# Patient Record
Sex: MALE | Race: BLACK OR AFRICAN AMERICAN | NOT HISPANIC OR LATINO | Employment: FULL TIME | URBAN - METROPOLITAN AREA
[De-identification: names, ages, dates, MRNs, and addresses within clinical notes are randomized per-mention and may not be internally consistent; named-entity substitution may affect disease eponyms.]

---

## 2019-08-14 ENCOUNTER — HOSPITAL ENCOUNTER (EMERGENCY)
Facility: HOSPITAL | Age: 64
Discharge: HOME | End: 2019-08-15
Attending: EMERGENCY MEDICINE
Payer: COMMERCIAL

## 2019-08-14 VITALS
DIASTOLIC BLOOD PRESSURE: 84 MMHG | WEIGHT: 190 LBS | SYSTOLIC BLOOD PRESSURE: 180 MMHG | BODY MASS INDEX: 30.53 KG/M2 | OXYGEN SATURATION: 97 % | RESPIRATION RATE: 20 BRPM | HEIGHT: 66 IN | TEMPERATURE: 99 F | HEART RATE: 89 BPM

## 2019-08-14 DIAGNOSIS — S91.301A OPEN WOUND OF RIGHT FOOT, INITIAL ENCOUNTER: ICD-10-CM

## 2019-08-14 DIAGNOSIS — I10 ESSENTIAL HYPERTENSION: ICD-10-CM

## 2019-08-14 DIAGNOSIS — R60.0 PEDAL EDEMA: ICD-10-CM

## 2019-08-14 DIAGNOSIS — B35.3 TINEA PEDIS OF BOTH FEET: Primary | ICD-10-CM

## 2019-08-14 LAB — POCT GLUCOSE: 102 MG/DL (ref 70–110)

## 2019-08-14 PROCEDURE — 25000003 PHARM REV CODE 250: Mod: ER | Performed by: EMERGENCY MEDICINE

## 2019-08-14 PROCEDURE — 82962 GLUCOSE BLOOD TEST: CPT | Mod: ER

## 2019-08-14 PROCEDURE — 99284 EMERGENCY DEPT VISIT MOD MDM: CPT | Mod: 25,ER

## 2019-08-14 RX ORDER — HYDROCHLOROTHIAZIDE 25 MG/1
25 TABLET ORAL DAILY
Qty: 30 TABLET | Refills: 0 | Status: SHIPPED | OUTPATIENT
Start: 2019-08-14 | End: 2021-03-19

## 2019-08-14 RX ORDER — CLOTRIMAZOLE 1 %
CREAM (GRAM) TOPICAL
Qty: 113 G | Refills: 0 | Status: SHIPPED | OUTPATIENT
Start: 2019-08-14 | End: 2019-09-20 | Stop reason: SDUPTHER

## 2019-08-14 RX ORDER — MUPIROCIN 20 MG/G
1 OINTMENT TOPICAL
Status: COMPLETED | OUTPATIENT
Start: 2019-08-14 | End: 2019-08-14

## 2019-08-14 RX ORDER — DOXYCYCLINE HYCLATE 100 MG
100 TABLET ORAL
Status: COMPLETED | OUTPATIENT
Start: 2019-08-14 | End: 2019-08-14

## 2019-08-14 RX ORDER — FUROSEMIDE 20 MG/1
40 TABLET ORAL
Status: COMPLETED | OUTPATIENT
Start: 2019-08-14 | End: 2019-08-14

## 2019-08-14 RX ORDER — DOXYCYCLINE 100 MG/1
100 CAPSULE ORAL 2 TIMES DAILY
Qty: 20 CAPSULE | Refills: 0 | Status: SHIPPED | OUTPATIENT
Start: 2019-08-14 | End: 2019-08-24

## 2019-08-14 RX ORDER — MUPIROCIN 20 MG/G
OINTMENT TOPICAL 3 TIMES DAILY
Qty: 30 G | Refills: 1 | Status: SHIPPED | OUTPATIENT
Start: 2019-08-14 | End: 2021-03-19

## 2019-08-14 RX ADMIN — MUPIROCIN 22 G: 20 OINTMENT TOPICAL at 11:08

## 2019-08-14 RX ADMIN — FUROSEMIDE 40 MG: 20 TABLET ORAL at 11:08

## 2019-08-14 RX ADMIN — DOXYCYCLINE HYCLATE 100 MG: 100 TABLET, COATED ORAL at 11:08

## 2019-08-15 NOTE — ED TRIAGE NOTES
pt has a wound on top of right foot x several weeks. [facility] but feels it is getting worse. denies fevers.

## 2019-08-15 NOTE — ED PROVIDER NOTES
"Encounter Date: 8/14/2019       History     Chief Complaint   Patient presents with    Wound Check     pt has a wound on top of right foot x several weeks. [facility] but feels it is getting worse. denies fevers.     64-year-old male presents for evaluation of wound on top of right foot has been present for several weeks.  Patient has been seen by [facility] for this problem but feels it is getting worse and they are not really helping.  Patient also reports wearing boots at work as feet often get wet and her swollen by the and today.  Patient also feels he has a fungal foot infection.  Patient is also concerned about a fungal infection in all of his toenails.        Review of patient's allergies indicates:  No Known Allergies  History reviewed. No pertinent past medical history.  Past Surgical History:   Procedure Laterality Date    APPENDECTOMY       History reviewed. No pertinent family history.  Social History     Tobacco Use    Smoking status: Current Every Day Smoker     Packs/day: 0.50     Types: Cigarettes    Smokeless tobacco: Never Used   Substance Use Topics    Alcohol use: Yes     Frequency: Never     Comment: "beer sometimes"    Drug use: Never     Review of Systems   Cardiovascular: Positive for leg swelling.   Skin: Positive for rash and wound.   All other systems reviewed and are negative.      Physical Exam     Initial Vitals [08/14/19 2311]   BP Pulse Resp Temp SpO2   (!) 180/84 89 20 98.6 °F (37 °C) 97 %      MAP       --         Physical Exam    Nursing note and vitals reviewed.  Constitutional: He appears well-developed and well-nourished.   HENT:   Head: Normocephalic and atraumatic.   Mouth/Throat: Oropharynx is clear and moist.   Eyes: EOM are normal. Pupils are equal, round, and reactive to light.   Cardiovascular: Normal rate, regular rhythm and normal heart sounds.   Pulmonary/Chest: Breath sounds normal.   Abdominal: Soft. Bowel sounds are normal. "   Musculoskeletal: He exhibits edema and tenderness.   Neurological: He is alert and oriented to person, place, and time. GCS score is 15. GCS eye subscore is 4. GCS verbal subscore is 5. GCS motor subscore is 6.   Skin: Capillary refill takes 2 to 3 seconds. Rash noted.   Positive onychomycosis on all toenails.  Positive tinea pedis bilateral feet.  Dorsal foot ulcer at proximal 3rd and 4th metatarsals.  Ulcer measures 2 cm by 1.8 cm and with pain base.  Appears to be stage I decubitus.         ED Course   Procedures  Labs Reviewed   POCT GLUCOSE         Results for orders placed or performed during the hospital encounter of 08/14/19   POCT glucose   Result Value Ref Range    POCT Glucose 102 70 - 110 mg/dL         Imaging Results    None                               Clinical Impression:       ICD-10-CM ICD-9-CM   1. Tinea pedis of both feet B35.3 110.4   2. Pedal edema R60.0 782.3   3. Essential hypertension I10 401.9   4. Open wound of right foot, initial encounter S91.301A 892.0                                Ricardo Dill MD  08/15/19 0248

## 2019-08-19 ENCOUNTER — TELEPHONE (OUTPATIENT)
Dept: FAMILY MEDICINE | Facility: CLINIC | Age: 64
End: 2019-08-19

## 2019-08-19 NOTE — TELEPHONE ENCOUNTER
Spoke to pt wife and she was given an appointment with Dr. Almanzar on Friday. The appointment slip was mailed to pt home.

## 2019-08-19 NOTE — TELEPHONE ENCOUNTER
----- Message from Leni Hawk sent at 8/19/2019  1:37 PM CDT -----  Contact: Nima Alcantar [phone]  New patient needs to be seen sooner than the next available appointment for an ER f/u.

## 2019-08-23 ENCOUNTER — LAB VISIT (OUTPATIENT)
Dept: LAB | Facility: HOSPITAL | Age: 64
End: 2019-08-23
Attending: FAMILY MEDICINE
Payer: COMMERCIAL

## 2019-08-23 ENCOUNTER — OFFICE VISIT (OUTPATIENT)
Dept: FAMILY MEDICINE | Facility: CLINIC | Age: 64
End: 2019-08-23
Payer: COMMERCIAL

## 2019-08-23 VITALS
HEART RATE: 86 BPM | TEMPERATURE: 99 F | SYSTOLIC BLOOD PRESSURE: 118 MMHG | WEIGHT: 198.88 LBS | DIASTOLIC BLOOD PRESSURE: 80 MMHG | OXYGEN SATURATION: 95 % | BODY MASS INDEX: 31.96 KG/M2 | HEIGHT: 66 IN

## 2019-08-23 DIAGNOSIS — Z11.59 NEED FOR HEPATITIS C SCREENING TEST: ICD-10-CM

## 2019-08-23 DIAGNOSIS — Z13.220 NEED FOR LIPID SCREENING: ICD-10-CM

## 2019-08-23 DIAGNOSIS — Z23 NEED FOR PNEUMOCOCCAL VACCINATION: ICD-10-CM

## 2019-08-23 DIAGNOSIS — B35.3 TINEA PEDIS OF BOTH FEET: ICD-10-CM

## 2019-08-23 DIAGNOSIS — B35.4 TINEA CORPORIS: ICD-10-CM

## 2019-08-23 DIAGNOSIS — Z23 NEED FOR TETANUS BOOSTER: ICD-10-CM

## 2019-08-23 DIAGNOSIS — Z12.11 SCREENING FOR COLON CANCER: Primary | ICD-10-CM

## 2019-08-23 DIAGNOSIS — T14.8XXA OPEN WOUND: ICD-10-CM

## 2019-08-23 LAB
ALBUMIN SERPL BCP-MCNC: 4.7 G/DL (ref 3.5–5.2)
ALP SERPL-CCNC: 80 U/L (ref 38–126)
ALT SERPL W/O P-5'-P-CCNC: 45 U/L (ref 10–44)
ANION GAP SERPL CALC-SCNC: 7 MMOL/L (ref 8–16)
AST SERPL-CCNC: 35 U/L (ref 15–46)
BASOPHILS # BLD AUTO: 0.05 K/UL (ref 0–0.2)
BASOPHILS NFR BLD: 0.9 % (ref 0–1.9)
BILIRUB SERPL-MCNC: 0.6 MG/DL (ref 0.1–1)
BUN SERPL-MCNC: 17 MG/DL (ref 2–20)
CALCIUM SERPL-MCNC: 9.9 MG/DL (ref 8.7–10.5)
CHLORIDE SERPL-SCNC: 97 MMOL/L (ref 95–110)
CHOLEST SERPL-MCNC: 203 MG/DL (ref 120–199)
CHOLEST/HDLC SERPL: 4.1 {RATIO} (ref 2–5)
CO2 SERPL-SCNC: 36 MMOL/L (ref 23–29)
CREAT SERPL-MCNC: 0.92 MG/DL (ref 0.5–1.4)
DIFFERENTIAL METHOD: ABNORMAL
EOSINOPHIL # BLD AUTO: 0.1 K/UL (ref 0–0.5)
EOSINOPHIL NFR BLD: 1.3 % (ref 0–8)
ERYTHROCYTE [DISTWIDTH] IN BLOOD BY AUTOMATED COUNT: 14 % (ref 11.5–14.5)
EST. GFR  (AFRICAN AMERICAN): >60 ML/MIN/1.73 M^2
EST. GFR  (NON AFRICAN AMERICAN): >60 ML/MIN/1.73 M^2
GLUCOSE SERPL-MCNC: 109 MG/DL (ref 70–110)
HCT VFR BLD AUTO: 46.4 % (ref 40–54)
HDLC SERPL-MCNC: 50 MG/DL (ref 40–75)
HDLC SERPL: 24.6 % (ref 20–50)
HGB BLD-MCNC: 15.2 G/DL (ref 14–18)
LDLC SERPL CALC-MCNC: 135.6 MG/DL (ref 63–159)
LYMPHOCYTES # BLD AUTO: 2.1 K/UL (ref 1–4.8)
LYMPHOCYTES NFR BLD: 38.5 % (ref 18–48)
MCH RBC QN AUTO: 28.3 PG (ref 27–31)
MCHC RBC AUTO-ENTMCNC: 32.8 G/DL (ref 32–36)
MCV RBC AUTO: 86 FL (ref 82–98)
MONOCYTES # BLD AUTO: 1 K/UL (ref 0.3–1)
MONOCYTES NFR BLD: 17.2 % (ref 4–15)
NEUTROPHILS # BLD AUTO: 2.3 K/UL (ref 1.8–7.7)
NEUTROPHILS NFR BLD: 42.1 % (ref 38–73)
NONHDLC SERPL-MCNC: 153 MG/DL
PLATELET # BLD AUTO: 197 K/UL (ref 150–350)
PMV BLD AUTO: 10.7 FL (ref 9.2–12.9)
POTASSIUM SERPL-SCNC: 3.8 MMOL/L (ref 3.5–5.1)
PROT SERPL-MCNC: 8.4 G/DL (ref 6–8.4)
RBC # BLD AUTO: 5.38 M/UL (ref 4.6–6.2)
SODIUM SERPL-SCNC: 140 MMOL/L (ref 136–145)
TRIGL SERPL-MCNC: 87 MG/DL (ref 30–150)
TSH SERPL DL<=0.005 MIU/L-ACNC: 1.8 UIU/ML (ref 0.4–4)
WBC # BLD AUTO: 5.53 K/UL (ref 3.9–12.7)

## 2019-08-23 PROCEDURE — 99203 PR OFFICE/OUTPT VISIT, NEW, LEVL III, 30-44 MIN: ICD-10-PCS | Mod: 25,S$GLB,, | Performed by: FAMILY MEDICINE

## 2019-08-23 PROCEDURE — 90715 TDAP VACCINE GREATER THAN OR EQUAL TO 7YO IM: ICD-10-PCS | Mod: S$GLB,,, | Performed by: FAMILY MEDICINE

## 2019-08-23 PROCEDURE — 90732 PPSV23 VACC 2 YRS+ SUBQ/IM: CPT | Mod: S$GLB,,, | Performed by: FAMILY MEDICINE

## 2019-08-23 PROCEDURE — 99203 OFFICE O/P NEW LOW 30 MIN: CPT | Mod: 25,S$GLB,, | Performed by: FAMILY MEDICINE

## 2019-08-23 PROCEDURE — 80061 LIPID PANEL: CPT

## 2019-08-23 PROCEDURE — 90715 TDAP VACCINE 7 YRS/> IM: CPT | Mod: S$GLB,,, | Performed by: FAMILY MEDICINE

## 2019-08-23 PROCEDURE — 85025 COMPLETE CBC W/AUTO DIFF WBC: CPT | Mod: PO

## 2019-08-23 PROCEDURE — 86803 HEPATITIS C AB TEST: CPT | Mod: PO

## 2019-08-23 PROCEDURE — 90472 IMMUNIZATION ADMIN EACH ADD: CPT | Mod: S$GLB,,, | Performed by: FAMILY MEDICINE

## 2019-08-23 PROCEDURE — 90471 IMMUNIZATION ADMIN: CPT | Mod: S$GLB,,, | Performed by: FAMILY MEDICINE

## 2019-08-23 PROCEDURE — 84443 ASSAY THYROID STIM HORMONE: CPT | Mod: PO

## 2019-08-23 PROCEDURE — 90732 PNEUMOCOCCAL POLYSACCHARIDE VACCINE 23-VALENT =>2YO SQ IM: ICD-10-PCS | Mod: S$GLB,,, | Performed by: FAMILY MEDICINE

## 2019-08-23 PROCEDURE — 90472 PNEUMOCOCCAL POLYSACCHARIDE VACCINE 23-VALENT =>2YO SQ IM: ICD-10-PCS | Mod: S$GLB,,, | Performed by: FAMILY MEDICINE

## 2019-08-23 PROCEDURE — 90471 TDAP VACCINE GREATER THAN OR EQUAL TO 7YO IM: ICD-10-PCS | Mod: S$GLB,,, | Performed by: FAMILY MEDICINE

## 2019-08-23 PROCEDURE — 80053 COMPREHEN METABOLIC PANEL: CPT | Mod: PO

## 2019-08-23 PROCEDURE — 36415 COLL VENOUS BLD VENIPUNCTURE: CPT | Mod: PO

## 2019-08-23 RX ORDER — FLUCONAZOLE 100 MG/1
100 TABLET ORAL DAILY
Qty: 30 TABLET | Refills: 0 | Status: SHIPPED | OUTPATIENT
Start: 2019-08-23 | End: 2019-09-22

## 2019-08-23 NOTE — PROGRESS NOTES
"Chief Complaint  Chief Complaint   Patient presents with    [facility]     ER f/u (Foot pain)       HPI  Avelino Caro is a 64 y.o. male with multiple medical diagnoses as listed in the medical history and problem list that presents for ER f/u.  He was seen in the ER on 8/14 for a wound on his foot.  The wound is on the dorsum.  Scant drainage.  Using bacitracing TID.  He reports that the wound is now healing.  In the ER he was given doycycline and HCTZ.      Rash:  Athlete's foot bilaterally and fungus on the nails.  Now using clotrimazole and rash is better.  Has a similar rash on his arms and hands.  No fevers.  No joint pain.  No SOB or wheezing.     PAST MEDICAL HISTORY:  History reviewed. No pertinent past medical history.    PAST SURGICAL HISTORY:  Past Surgical History:   Procedure Laterality Date    APPENDECTOMY         SOCIAL HISTORY:  Social History     Socioeconomic History    Marital status:      Spouse name: Not on file    Number of children: Not on file    Years of education: Not on file    Highest education level: Not on file   Occupational History    Not on file   Social Needs    Financial resource strain: Not on file    Food insecurity:     Worry: Not on file     Inability: Not on file    Transportation needs:     Medical: Not on file     Non-medical: Not on file   Tobacco Use    Smoking status: Current Every Day Smoker     Packs/day: 0.50     Types: Cigarettes    Smokeless tobacco: Never Used   Substance and Sexual Activity    Alcohol use: Yes     Frequency: Never     Comment: "beer sometimes"    Drug use: Never    Sexual activity: Not on file   Lifestyle    Physical activity:     Days per week: Not on file     Minutes per session: Not on file    Stress: Not on file   Relationships    Social connections:     Talks on phone: Not on file     Gets together: Not on file     Attends Church service: Not on file     Active member of club or organization: Not on file    "  Attends meetings of clubs or organizations: Not on file     Relationship status: Not on file   Other Topics Concern    Not on file   Social History Narrative    Not on file       FAMILY HISTORY:  History reviewed. No pertinent family history.    ALLERGIES AND MEDICATIONS: updated and reviewed.  Review of patient's allergies indicates:  No Known Allergies  Current Outpatient Medications   Medication Sig Dispense Refill    clotrimazole (LOTRIMIN) 1 % cream Apply to affected area 2 times daily 113 g 0    doxycycline (VIBRAMYCIN) 100 MG Cap Take 1 capsule (100 mg total) by mouth 2 (two) times daily. for 10 days 20 capsule 0    hydroCHLOROthiazide (HYDRODIURIL) 25 MG tablet Take 1 tablet (25 mg total) by mouth once daily. 30 tablet 0    mupirocin (BACTROBAN) 2 % ointment Apply topically 3 (three) times daily. 30 g 1    fluconazole (DIFLUCAN) 100 MG tablet Take 1 tablet (100 mg total) by mouth once daily. 30 tablet 0     No current facility-administered medications for this visit.          ROS  Review of Systems   Constitutional: Negative for activity change, appetite change, chills and fatigue.   HENT: Negative for congestion, ear discharge, hearing loss, mouth sores, rhinorrhea, sinus pain and trouble swallowing.    Eyes: Negative for photophobia, pain, discharge and visual disturbance.   Respiratory: Negative for cough, chest tightness, shortness of breath and wheezing.    Cardiovascular: Negative for chest pain, palpitations and leg swelling.   Gastrointestinal: Negative for abdominal distention, abdominal pain, blood in stool, constipation, diarrhea, nausea and vomiting.   Genitourinary: Negative for difficulty urinating, dysuria and flank pain.   Musculoskeletal: Negative for arthralgias, back pain, gait problem, joint swelling and myalgias.   Skin: Negative for color change and rash.   Neurological: Negative for dizziness, tremors, speech difficulty, light-headedness, numbness and headaches.  "  Psychiatric/Behavioral: Negative for behavioral problems, confusion, hallucinations, sleep disturbance and suicidal ideas.           PHYSICAL EXAM  Vitals:    08/23/19 0837   BP: 118/80   Pulse: 86   Temp: 98.5 °F (36.9 °C)   SpO2: 95%   Weight: 90.2 kg (198 lb 13.7 oz)   Height: 5' 6" (1.676 m)    Body mass index is 32.1 kg/m².  Weight: 90.2 kg (198 lb 13.7 oz)   Height: 5' 6" (167.6 cm)     Physical Exam   Constitutional: He is oriented to person, place, and time. He appears well-developed. No distress.   HENT:   Head: Normocephalic.   Right Ear: External ear normal.   Left Ear: External ear normal.   Mouth/Throat: No oropharyngeal exudate.   Eyes: Conjunctivae are normal. Right eye exhibits no discharge. Left eye exhibits no discharge.   Neck: Normal range of motion. Neck supple. No thyromegaly present.   Cardiovascular: Normal rate and regular rhythm. Exam reveals no friction rub.   No murmur heard.  Pulmonary/Chest: Effort normal and breath sounds normal. No stridor. No respiratory distress. He has no wheezes.   Abdominal: Soft. Bowel sounds are normal. He exhibits no distension. There is no tenderness. There is no guarding.   Musculoskeletal: Normal range of motion. He exhibits no edema or deformity.   Neurological: He is alert and oriented to person, place, and time. No cranial nerve deficit. Coordination normal.   Skin: Skin is warm. No rash noted. He is not diaphoretic. No erythema. No pallor.        Scaly rash on bilater feel and hands.    Wound on dorsum of right foot is healing with granulation sittue at the edges.  Scant serous drainage.    Psychiatric: He has a normal mood and affect. Thought content normal.   Nursing note and vitals reviewed.        Health Maintenance       Date Due Completion Date    Hepatitis C Screening 1955 ---    Lipid Panel 1955 ---    TETANUS VACCINE 04/13/1974 ---    Pneumococcal Vaccine (Medium Risk) (1 of 1 - PPSV23) 04/13/1975 ---    Colonoscopy 04/13/2006 --- "    Shingles Vaccine (1 of 2) 08/23/2020 (Originally 4/13/2006) ---    Influenza Vaccine (1) 09/01/2019 ---            Assessment & Plan      Avelino was seen today for establish care.    Diagnoses and all orders for this visit:    Screening for colon cancer  -     Case request GI: COLONOSCOPY    Need for pneumococcal vaccination  -     (In Office Administered) Pneumococcal Polysaccharide Vaccine (23 Valent) (SQ/IM)    Need for tetanus booster  -     (In Office Administered) Tdap Vaccine    Need for lipid screening  -     Lipid panel; Future    Need for hepatitis C screening test  -     Hepatitis C antibody; Future    Tinea corporis  -     fluconazole (DIFLUCAN) 100 MG tablet; Take 1 tablet (100 mg total) by mouth once daily.  -     CBC auto differential; Future  -     Comprehensive metabolic panel; Future  -     TSH; Future    Tinea pedis of both feet   Continue with clotrimazole.  I am adding diflucan orally as well to help with nails and hands.   Open wound     Continue treating with bacitracin and keep covered.        Follow-up: No follow-ups on file.

## 2019-08-26 LAB — HCV AB SERPL QL IA: NEGATIVE

## 2019-09-12 ENCOUNTER — TELEPHONE (OUTPATIENT)
Dept: GASTROENTEROLOGY | Facility: CLINIC | Age: 64
End: 2019-09-12

## 2019-09-19 NOTE — PROGRESS NOTES
"Chief Complaint  No chief complaint on file.      HPI  Avelino Caro is a 64 y.o. male with multiple medical diagnoses as listed in the medical history and problem list that presents for ER f/u.  He was seen in the ER on 8/14 for a wound on his foot.  The wound is on the dorsum. Now almost completely healed.  Skin is dark and he reports the beginning of breakdown on the dorsum of his left foot.  Also has skin changes on his lower legs.    Rash:  Athlete's foot bilaterally and fungus on the nails.  Now using clotrimazole and rash is better.  Has a similar rash on his arms and hands.  No fevers.  No joint pain.  No SOB or wheezing.     Fine nodular rash scattered over upper arms, back and neck.     PAST MEDICAL HISTORY:  No past medical history on file.    PAST SURGICAL HISTORY:  Past Surgical History:   Procedure Laterality Date    APPENDECTOMY         SOCIAL HISTORY:  Social History     Socioeconomic History    Marital status:      Spouse name: Not on file    Number of children: Not on file    Years of education: Not on file    Highest education level: Not on file   Occupational History    Not on file   Social Needs    Financial resource strain: Not on file    Food insecurity:     Worry: Not on file     Inability: Not on file    Transportation needs:     Medical: Not on file     Non-medical: Not on file   Tobacco Use    Smoking status: Current Every Day Smoker     Packs/day: 0.50     Types: Cigarettes    Smokeless tobacco: Never Used   Substance and Sexual Activity    Alcohol use: Yes     Frequency: Never     Comment: "beer sometimes"    Drug use: Never    Sexual activity: Not on file   Lifestyle    Physical activity:     Days per week: Not on file     Minutes per session: Not on file    Stress: Not on file   Relationships    Social connections:     Talks on phone: Not on file     Gets together: Not on file     Attends Latter day service: Not on file     Active member of club or " organization: Not on file     Attends meetings of clubs or organizations: Not on file     Relationship status: Not on file   Other Topics Concern    Not on file   Social History Narrative    Not on file       FAMILY HISTORY:  No family history on file.    ALLERGIES AND MEDICATIONS: updated and reviewed.  Review of patient's allergies indicates:  No Known Allergies  Current Outpatient Medications   Medication Sig Dispense Refill    clotrimazole (LOTRIMIN) 1 % cream Apply to affected area 2 times daily 113 g 0    fluconazole (DIFLUCAN) 100 MG tablet Take 1 tablet (100 mg total) by mouth once daily. 30 tablet 0    hydroCHLOROthiazide (HYDRODIURIL) 25 MG tablet Take 1 tablet (25 mg total) by mouth once daily. 30 tablet 0    mupirocin (BACTROBAN) 2 % ointment Apply topically 3 (three) times daily. 30 g 1     No current facility-administered medications for this visit.          ROS  Review of Systems   Constitutional: Negative for activity change, appetite change, chills and fatigue.   HENT: Negative for congestion, ear discharge, hearing loss, mouth sores, rhinorrhea, sinus pain and trouble swallowing.    Eyes: Negative for photophobia, pain, discharge and visual disturbance.   Respiratory: Negative for cough, chest tightness, shortness of breath and wheezing.    Cardiovascular: Negative for chest pain, palpitations and leg swelling.   Gastrointestinal: Negative for abdominal distention, abdominal pain, blood in stool, constipation, diarrhea, nausea and vomiting.   Genitourinary: Negative for difficulty urinating, dysuria and flank pain.   Musculoskeletal: Negative for arthralgias, back pain, gait problem, joint swelling and myalgias.   Skin: Negative for color change and rash.   Neurological: Negative for dizziness, tremors, speech difficulty, light-headedness, numbness and headaches.   Psychiatric/Behavioral: Negative for behavioral problems, confusion, hallucinations, sleep disturbance and suicidal ideas.  "          PHYSICAL EXAM    /78 (BP Location: Right arm, Patient Position: Sitting, BP Method: Medium (Manual))   Pulse 81   Temp 98.6 °F (37 °C) (Oral)   Ht 5' 6" (1.676 m)   Wt 92.4 kg (203 lb 13 oz)   SpO2 96%   BMI 32.90 kg/m²           Physical Exam   Constitutional: He is oriented to person, place, and time. He appears well-developed. No distress.   HENT:   Head: Normocephalic.   Right Ear: External ear normal.   Left Ear: External ear normal.   Mouth/Throat: No oropharyngeal exudate.   Eyes: Conjunctivae are normal. Right eye exhibits no discharge. Left eye exhibits no discharge.   Neck: Normal range of motion. Neck supple. No thyromegaly present.   Cardiovascular: Normal rate and regular rhythm. Exam reveals no friction rub.   No murmur heard.  Pulmonary/Chest: Effort normal and breath sounds normal. No stridor. No respiratory distress. He has no wheezes.   Abdominal: Soft. Bowel sounds are normal. He exhibits no distension. There is no tenderness. There is no guarding.   Musculoskeletal: Normal range of motion. He exhibits no edema or deformity.   Neurological: He is alert and oriented to person, place, and time. No cranial nerve deficit. Coordination normal.   Skin: Skin is warm. No rash noted. He is not diaphoretic. No erythema. No pallor.        Scaly rash on bilater feel and hands.    Wound on dorsum of right foot is healing with granulation sittue at the edges.  Scant serous drainage.    Psychiatric: He has a normal mood and affect. Thought content normal.   Nursing note and vitals reviewed.        Health Maintenance       Date Due Completion Date    Hepatitis C Screening 1955 ---    Lipid Panel 1955 ---    TETANUS VACCINE 04/13/1974 ---    Pneumococcal Vaccine (Medium Risk) (1 of 1 - PPSV23) 04/13/1975 ---    Colonoscopy 04/13/2006 ---    Shingles Vaccine (1 of 2) 08/23/2020 (Originally 4/13/2006) ---    Influenza Vaccine (1) 09/01/2019 ---        No visits with results within 2 " Week(s) from this visit.   Latest known visit with results is:   Lab Visit on 08/23/2019   Component Date Value Ref Range Status    Hepatitis C Ab 08/23/2019 Negative   Final    Cholesterol 08/23/2019 203* 120 - 199 mg/dL Final    Comment: The National Cholesterol Education Program (NCEP) has set the  following guidelines (reference ranges) for Cholesterol:  Optimal.....................<200 mg/dL  Borderline High.............200-239 mg/dL  High........................> or = 240 mg/dL      Triglycerides 08/23/2019 87  30 - 150 mg/dL Final    Comment: The National Cholesterol Education Program (NCEP) has set the  following guidelines (reference values) for triglycerides:  Normal......................<150 mg/dL  Borderline High.............150-199 mg/dL  High........................200-499 mg/dL      HDL 08/23/2019 50  40 - 75 mg/dL Final    Comment: The National Cholesterol Education Program (NCEP) has set the  following guidelines (reference values) for HDL Cholesterol:  Low...............<40 mg/dL  Optimal...........>60 mg/dL      LDL Cholesterol 08/23/2019 135.6  63.0 - 159.0 mg/dL Final    Comment: The National Cholesterol Education Program (NCEP) has set the  following guidelines (reference values) for LDL Cholesterol:  Optimal.......................<130 mg/dL  Borderline High...............130-159 mg/dL  High..........................160-189 mg/dL  Very High.....................>190 mg/dL      Hdl/Cholesterol Ratio 08/23/2019 24.6  20.0 - 50.0 % Final    Total Cholesterol/HDL Ratio 08/23/2019 4.1  2.0 - 5.0 Final    Non-HDL Cholesterol 08/23/2019 153  mg/dL Final    Comment: Risk category and Non-HDL cholesterol goals:  Coronary heart disease (CHD)or equivalent (10-year risk of CHD >20%):  Non-HDL cholesterol goal     <130 mg/dL  Two or more CHD risk factors and 10-year risk of CHD <= 20%:  Non-HDL cholesterol goal     <160 mg/dL  0 to 1 CHD risk factor:  Non-HDL cholesterol goal     <190 mg/dL      WBC  08/23/2019 5.53  3.90 - 12.70 K/uL Final    RBC 08/23/2019 5.38  4.60 - 6.20 M/uL Final    Hemoglobin 08/23/2019 15.2  14.0 - 18.0 g/dL Final    Hematocrit 08/23/2019 46.4  40.0 - 54.0 % Final    Mean Corpuscular Volume 08/23/2019 86  82 - 98 fL Final    Mean Corpuscular Hemoglobin 08/23/2019 28.3  27.0 - 31.0 pg Final    Mean Corpuscular Hemoglobin Conc 08/23/2019 32.8  32.0 - 36.0 g/dL Final    RDW 08/23/2019 14.0  11.5 - 14.5 % Final    Platelets 08/23/2019 197  150 - 350 K/uL Final    MPV 08/23/2019 10.7  9.2 - 12.9 fL Final    Gran # (ANC) 08/23/2019 2.3  1.8 - 7.7 K/uL Final    Lymph # 08/23/2019 2.1  1.0 - 4.8 K/uL Final    Mono # 08/23/2019 1.0  0.3 - 1.0 K/uL Final    Eos # 08/23/2019 0.1  0.0 - 0.5 K/uL Final    Baso # 08/23/2019 0.05  0.00 - 0.20 K/uL Final    Gran% 08/23/2019 42.1  38.0 - 73.0 % Final    Lymph% 08/23/2019 38.5  18.0 - 48.0 % Final    Mono% 08/23/2019 17.2* 4.0 - 15.0 % Final    Eosinophil% 08/23/2019 1.3  0.0 - 8.0 % Final    Basophil% 08/23/2019 0.9  0.0 - 1.9 % Final    Differential Method 08/23/2019 Automated   Final    Sodium 08/23/2019 140  136 - 145 mmol/L Final    Potassium 08/23/2019 3.8  3.5 - 5.1 mmol/L Final    Chloride 08/23/2019 97  95 - 110 mmol/L Final    CO2 08/23/2019 36* 23 - 29 mmol/L Final    Glucose 08/23/2019 109  70 - 110 mg/dL Final    BUN, Bld 08/23/2019 17  2 - 20 mg/dL Final    Creatinine 08/23/2019 0.92  0.50 - 1.40 mg/dL Final    Calcium 08/23/2019 9.9  8.7 - 10.5 mg/dL Final    Total Protein 08/23/2019 8.4  6.0 - 8.4 g/dL Final    Albumin 08/23/2019 4.7  3.5 - 5.2 g/dL Final    Total Bilirubin 08/23/2019 0.6  0.1 - 1.0 mg/dL Final    Comment: For infants and newborns, interpretation of results should be based  on gestational age, weight and in agreement with clinical  observations.  Premature Infant recommended reference ranges:  Up to 24 hours.............<8.0 mg/dL  Up to 48 hours............<12.0 mg/dL  3-5  days..................<15.0 mg/dL  6-29 days.................<15.0 mg/dL      Alkaline Phosphatase 08/23/2019 80  38 - 126 U/L Final    AST 08/23/2019 35  15 - 46 U/L Final    ALT 08/23/2019 45* 10 - 44 U/L Final    Anion Gap 08/23/2019 7* 8 - 16 mmol/L Final    eGFR if  08/23/2019 >60.0  >60 mL/min/1.73 m^2 Final    eGFR if non African American 08/23/2019 >60.0  >60 mL/min/1.73 m^2 Final    Comment: Calculation used to obtain the estimated glomerular filtration  rate (eGFR) is the CKD-EPI equation.       TSH 08/23/2019 1.800  0.400 - 4.000 uIU/mL Final    Comment: Warning:  Heterophilic antibodies in serum or plasma of   certain individuals are known to cause interference with   immunoassays. These antibodies may be present in blood samples   from individuals regularly exposed to animal or who have been   treated with animal products.  Patients taking high doses of supplemental biotin may have  negatively biased results.            Assessment & Plan      Avelino was seen today for establish care.    Diagnoses and all orders for this visit:    Venous stasis dermatitis of both lower extremities   - Compression stockings recommended.   Tinea pedis of both feet  -     clotrimazole (LOTRIMIN) 1 % cream; Apply to affected area 2 times daily  Dispense: 113 g; Refill: 5    Rash and nonspecific skin eruption  -     triamcinolone acetonide 0.1% (KENALOG) 0.1 % cream; Apply topically 2 (two) times daily.  Dispense: 80 g; Refill: 3    Need for influenza vaccination  -     Influenza - Quadrivalent (6 months+) (PF)            Follow-up: No follow-ups on file.

## 2019-09-20 ENCOUNTER — OFFICE VISIT (OUTPATIENT)
Dept: FAMILY MEDICINE | Facility: CLINIC | Age: 64
End: 2019-09-20
Payer: COMMERCIAL

## 2019-09-20 VITALS
BODY MASS INDEX: 32.76 KG/M2 | WEIGHT: 203.81 LBS | OXYGEN SATURATION: 96 % | SYSTOLIC BLOOD PRESSURE: 128 MMHG | DIASTOLIC BLOOD PRESSURE: 78 MMHG | TEMPERATURE: 99 F | HEIGHT: 66 IN | HEART RATE: 81 BPM

## 2019-09-20 DIAGNOSIS — Z23 NEED FOR INFLUENZA VACCINATION: ICD-10-CM

## 2019-09-20 DIAGNOSIS — R21 RASH AND NONSPECIFIC SKIN ERUPTION: ICD-10-CM

## 2019-09-20 DIAGNOSIS — I87.2 VENOUS STASIS DERMATITIS OF BOTH LOWER EXTREMITIES: Primary | ICD-10-CM

## 2019-09-20 DIAGNOSIS — B35.3 TINEA PEDIS OF BOTH FEET: ICD-10-CM

## 2019-09-20 PROCEDURE — 90471 FLU VACCINE (QUAD) GREATER THAN OR EQUAL TO 3YO PRESERVATIVE FREE IM: ICD-10-PCS | Mod: S$GLB,,, | Performed by: FAMILY MEDICINE

## 2019-09-20 PROCEDURE — 90471 IMMUNIZATION ADMIN: CPT | Mod: S$GLB,,, | Performed by: FAMILY MEDICINE

## 2019-09-20 PROCEDURE — 99214 PR OFFICE/OUTPT VISIT, EST, LEVL IV, 30-39 MIN: ICD-10-PCS | Mod: 25,S$GLB,, | Performed by: FAMILY MEDICINE

## 2019-09-20 PROCEDURE — 90686 IIV4 VACC NO PRSV 0.5 ML IM: CPT | Mod: S$GLB,,, | Performed by: FAMILY MEDICINE

## 2019-09-20 PROCEDURE — 90686 FLU VACCINE (QUAD) GREATER THAN OR EQUAL TO 3YO PRESERVATIVE FREE IM: ICD-10-PCS | Mod: S$GLB,,, | Performed by: FAMILY MEDICINE

## 2019-09-20 PROCEDURE — 99214 OFFICE O/P EST MOD 30 MIN: CPT | Mod: 25,S$GLB,, | Performed by: FAMILY MEDICINE

## 2019-09-20 RX ORDER — TRIAMCINOLONE ACETONIDE 1 MG/G
CREAM TOPICAL 2 TIMES DAILY
Qty: 80 G | Refills: 3 | Status: SHIPPED | OUTPATIENT
Start: 2019-09-20 | End: 2021-03-19

## 2019-09-20 RX ORDER — CLOTRIMAZOLE 1 %
CREAM (GRAM) TOPICAL
Qty: 113 G | Refills: 5 | Status: SHIPPED | OUTPATIENT
Start: 2019-09-20 | End: 2019-09-20 | Stop reason: SDUPTHER

## 2019-09-20 RX ORDER — CLOTRIMAZOLE 1 %
CREAM (GRAM) TOPICAL
Qty: 113 G | Refills: 5 | Status: SHIPPED | OUTPATIENT
Start: 2019-09-20 | End: 2021-03-19

## 2019-10-07 ENCOUNTER — TELEPHONE (OUTPATIENT)
Dept: GASTROENTEROLOGY | Facility: CLINIC | Age: 64
End: 2019-10-07

## 2019-10-15 ENCOUNTER — TELEPHONE (OUTPATIENT)
Dept: GASTROENTEROLOGY | Facility: CLINIC | Age: 64
End: 2019-10-15

## 2021-03-19 ENCOUNTER — HOSPITAL ENCOUNTER (OUTPATIENT)
Dept: RADIOLOGY | Facility: HOSPITAL | Age: 66
Discharge: HOME | End: 2021-03-19
Attending: FAMILY MEDICINE
Payer: COMMERCIAL

## 2021-03-19 ENCOUNTER — OFFICE VISIT (OUTPATIENT)
Dept: FAMILY MEDICINE | Facility: CLINIC | Age: 66
End: 2021-03-19
Payer: COMMERCIAL

## 2021-03-19 VITALS
WEIGHT: 205.5 LBS | HEART RATE: 81 BPM | SYSTOLIC BLOOD PRESSURE: 118 MMHG | DIASTOLIC BLOOD PRESSURE: 72 MMHG | BODY MASS INDEX: 32.25 KG/M2 | HEIGHT: 67 IN | TEMPERATURE: 97 F | OXYGEN SATURATION: 96 %

## 2021-03-19 DIAGNOSIS — E55.9 VITAMIN D DEFICIENCY: ICD-10-CM

## 2021-03-19 DIAGNOSIS — L98.9 SKIN LESION OF BACK: ICD-10-CM

## 2021-03-19 DIAGNOSIS — L30.9 ECZEMA, UNSPECIFIED TYPE: ICD-10-CM

## 2021-03-19 DIAGNOSIS — Z12.11 COLON CANCER SCREENING: ICD-10-CM

## 2021-03-19 DIAGNOSIS — R21 RASH AND NONSPECIFIC SKIN ERUPTION: ICD-10-CM

## 2021-03-19 DIAGNOSIS — L98.9 SKIN LESION OF BACK: Primary | ICD-10-CM

## 2021-03-19 DIAGNOSIS — Z13.220 LIPID SCREENING: ICD-10-CM

## 2021-03-19 DIAGNOSIS — E66.9 CLASS 1 OBESITY WITH BODY MASS INDEX (BMI) OF 30.0 TO 30.9 IN ADULT, UNSPECIFIED OBESITY TYPE, UNSPECIFIED WHETHER SERIOUS COMORBIDITY PRESENT: ICD-10-CM

## 2021-03-19 PROCEDURE — 99214 PR OFFICE/OUTPT VISIT, EST, LEVL IV, 30-39 MIN: ICD-10-PCS | Mod: S$GLB,,, | Performed by: FAMILY MEDICINE

## 2021-03-19 PROCEDURE — 76536 US EXAM OF HEAD AND NECK: CPT | Mod: TC,PO

## 2021-03-19 PROCEDURE — 99214 OFFICE O/P EST MOD 30 MIN: CPT | Mod: S$GLB,,, | Performed by: FAMILY MEDICINE

## 2021-03-19 RX ORDER — TRIAMCINOLONE ACETONIDE 1 MG/G
CREAM TOPICAL 2 TIMES DAILY
Qty: 80 G | Refills: 3 | Status: SHIPPED | OUTPATIENT
Start: 2021-03-19

## 2021-04-21 ENCOUNTER — TELEPHONE (OUTPATIENT)
Dept: GASTROENTEROLOGY | Facility: CLINIC | Age: 66
End: 2021-04-21

## 2021-04-27 ENCOUNTER — OFFICE VISIT (OUTPATIENT)
Dept: FAMILY MEDICINE | Facility: CLINIC | Age: 66
End: 2021-04-27
Payer: COMMERCIAL

## 2021-04-27 VITALS
OXYGEN SATURATION: 96 % | TEMPERATURE: 98 F | WEIGHT: 202.69 LBS | BODY MASS INDEX: 31.81 KG/M2 | HEIGHT: 67 IN | SYSTOLIC BLOOD PRESSURE: 154 MMHG | HEART RATE: 111 BPM | DIASTOLIC BLOOD PRESSURE: 78 MMHG

## 2021-04-27 DIAGNOSIS — L30.9 ECZEMA, UNSPECIFIED TYPE: ICD-10-CM

## 2021-04-27 DIAGNOSIS — Z12.11 COLON CANCER SCREENING: ICD-10-CM

## 2021-04-27 DIAGNOSIS — Z23 NEED FOR PNEUMOCOCCAL VACCINATION: ICD-10-CM

## 2021-04-27 DIAGNOSIS — N40.0 BENIGN PROSTATIC HYPERPLASIA, UNSPECIFIED WHETHER LOWER URINARY TRACT SYMPTOMS PRESENT: ICD-10-CM

## 2021-04-27 DIAGNOSIS — E55.9 VITAMIN D DEFICIENCY: ICD-10-CM

## 2021-04-27 DIAGNOSIS — Z13.6 ENCOUNTER FOR ABDOMINAL AORTIC ANEURYSM (AAA) SCREENING: ICD-10-CM

## 2021-04-27 DIAGNOSIS — B35.3 TINEA PEDIS, UNSPECIFIED LATERALITY: Primary | ICD-10-CM

## 2021-04-27 PROCEDURE — 99214 PR OFFICE/OUTPT VISIT, EST, LEVL IV, 30-39 MIN: ICD-10-PCS | Mod: 25,S$GLB,, | Performed by: FAMILY MEDICINE

## 2021-04-27 PROCEDURE — 90732 PNEUMOCOCCAL POLYSACCHARIDE VACCINE 23-VALENT =>2YO SQ IM: ICD-10-PCS | Mod: S$GLB,,, | Performed by: FAMILY MEDICINE

## 2021-04-27 PROCEDURE — 90732 PPSV23 VACC 2 YRS+ SUBQ/IM: CPT | Mod: S$GLB,,, | Performed by: FAMILY MEDICINE

## 2021-04-27 PROCEDURE — 90471 PNEUMOCOCCAL POLYSACCHARIDE VACCINE 23-VALENT =>2YO SQ IM: ICD-10-PCS | Mod: S$GLB,,, | Performed by: FAMILY MEDICINE

## 2021-04-27 PROCEDURE — 99214 OFFICE O/P EST MOD 30 MIN: CPT | Mod: 25,S$GLB,, | Performed by: FAMILY MEDICINE

## 2021-04-27 PROCEDURE — 90471 IMMUNIZATION ADMIN: CPT | Mod: S$GLB,,, | Performed by: FAMILY MEDICINE

## 2021-04-27 RX ORDER — TAMSULOSIN HYDROCHLORIDE 0.4 MG/1
0.4 CAPSULE ORAL DAILY
Qty: 30 CAPSULE | Refills: 11 | Status: SHIPPED | OUTPATIENT
Start: 2021-04-27 | End: 2022-04-27

## 2021-04-27 RX ORDER — CLOTRIMAZOLE 1 %
CREAM (GRAM) TOPICAL 2 TIMES DAILY
Qty: 60 G | Refills: 3 | Status: SHIPPED | OUTPATIENT
Start: 2021-04-27

## 2021-04-29 ENCOUNTER — TELEPHONE (OUTPATIENT)
Dept: ADMINISTRATIVE | Facility: OTHER | Age: 66
End: 2021-04-29

## 2021-05-18 ENCOUNTER — TELEPHONE (OUTPATIENT)
Dept: FAMILY MEDICINE | Facility: CLINIC | Age: 66
End: 2021-05-18

## 2021-05-20 ENCOUNTER — HOSPITAL ENCOUNTER (OUTPATIENT)
Dept: RADIOLOGY | Facility: HOSPITAL | Age: 66
Discharge: HOME | End: 2021-05-20
Attending: FAMILY MEDICINE
Payer: MEDICARE

## 2021-05-20 DIAGNOSIS — Z13.6 ENCOUNTER FOR ABDOMINAL AORTIC ANEURYSM (AAA) SCREENING: ICD-10-CM

## 2021-05-20 PROCEDURE — 76775 US EXAM ABDO BACK WALL LIM: CPT | Mod: TC,PO

## 2024-03-18 ENCOUNTER — TELEPHONE (OUTPATIENT)
Dept: FAMILY MEDICINE | Facility: CLINIC | Age: 69
End: 2024-03-18
Payer: MEDICARE

## 2024-03-18 NOTE — TELEPHONE ENCOUNTER
----- Message from Renetta Anne sent at 3/18/2024 11:38 AM CDT -----  Regarding: appt  Contact: self/ walked in  The pt would like an appt for a check up.  Please call him at [phone].  Last saw Dr Guo.  Ok to call wife Kathy [phone] (wrong number)    I attempted to call the pt at [phone] - the person that answered hung up  
No complaints

## 2024-03-22 ENCOUNTER — OFFICE VISIT (OUTPATIENT)
Dept: FAMILY MEDICINE | Facility: CLINIC | Age: 69
End: 2024-03-22
Payer: MEDICARE

## 2024-03-22 VITALS
OXYGEN SATURATION: 94 % | BODY MASS INDEX: 32.91 KG/M2 | HEART RATE: 87 BPM | HEIGHT: 67 IN | DIASTOLIC BLOOD PRESSURE: 78 MMHG | WEIGHT: 209.69 LBS | TEMPERATURE: 98 F | SYSTOLIC BLOOD PRESSURE: 136 MMHG

## 2024-03-22 DIAGNOSIS — Z12.11 SCREENING FOR COLON CANCER: ICD-10-CM

## 2024-03-22 DIAGNOSIS — N50.89 MASS, SCROTUM: Primary | ICD-10-CM

## 2024-03-22 DIAGNOSIS — E66.09 CLASS 1 OBESITY DUE TO EXCESS CALORIES WITHOUT SERIOUS COMORBIDITY WITH BODY MASS INDEX (BMI) OF 32.0 TO 32.9 IN ADULT: ICD-10-CM

## 2024-03-22 DIAGNOSIS — R73.9 HYPERGLYCEMIA: ICD-10-CM

## 2024-03-22 DIAGNOSIS — Z13.6 SCREENING FOR CARDIOVASCULAR CONDITION: ICD-10-CM

## 2024-03-22 DIAGNOSIS — Z12.5 SCREENING PSA (PROSTATE SPECIFIC ANTIGEN): ICD-10-CM

## 2024-03-22 PROCEDURE — 99214 OFFICE O/P EST MOD 30 MIN: CPT | Mod: S$GLB,,, | Performed by: PHYSICIAN ASSISTANT

## 2024-03-22 PROCEDURE — 3008F BODY MASS INDEX DOCD: CPT | Mod: CPTII,S$GLB,, | Performed by: PHYSICIAN ASSISTANT

## 2024-03-22 PROCEDURE — 1159F MED LIST DOCD IN RCRD: CPT | Mod: CPTII,S$GLB,, | Performed by: PHYSICIAN ASSISTANT

## 2024-03-22 PROCEDURE — 3288F FALL RISK ASSESSMENT DOCD: CPT | Mod: CPTII,S$GLB,, | Performed by: PHYSICIAN ASSISTANT

## 2024-03-22 PROCEDURE — 3078F DIAST BP <80 MM HG: CPT | Mod: CPTII,S$GLB,, | Performed by: PHYSICIAN ASSISTANT

## 2024-03-22 PROCEDURE — 1126F AMNT PAIN NOTED NONE PRSNT: CPT | Mod: CPTII,S$GLB,, | Performed by: PHYSICIAN ASSISTANT

## 2024-03-22 PROCEDURE — 1101F PT FALLS ASSESS-DOCD LE1/YR: CPT | Mod: CPTII,S$GLB,, | Performed by: PHYSICIAN ASSISTANT

## 2024-03-22 PROCEDURE — 3075F SYST BP GE 130 - 139MM HG: CPT | Mod: CPTII,S$GLB,, | Performed by: PHYSICIAN ASSISTANT

## 2024-03-22 PROCEDURE — 1160F RVW MEDS BY RX/DR IN RCRD: CPT | Mod: CPTII,S$GLB,, | Performed by: PHYSICIAN ASSISTANT

## 2024-03-25 ENCOUNTER — HOSPITAL ENCOUNTER (OUTPATIENT)
Dept: RADIOLOGY | Facility: HOSPITAL | Age: 69
Discharge: HOME | End: 2024-03-25
Attending: PHYSICIAN ASSISTANT
Payer: MEDICARE

## 2024-03-25 ENCOUNTER — TELEPHONE (OUTPATIENT)
Dept: FAMILY MEDICINE | Facility: CLINIC | Age: 69
End: 2024-03-25
Payer: MEDICARE

## 2024-03-25 DIAGNOSIS — N50.89 MASS, SCROTUM: ICD-10-CM

## 2024-03-25 PROCEDURE — 76870 US EXAM SCROTUM: CPT | Mod: TC,PN

## 2024-03-25 PROCEDURE — 76870 US EXAM SCROTUM: CPT | Mod: 26,,, | Performed by: RADIOLOGY

## 2024-03-25 NOTE — TELEPHONE ENCOUNTER
----- Message from Ashley Han PA-C sent at 3/25/2024 11:14 AM CDT -----  Cysts bilaterally; no further imaging or treatment needed. Please call pt to advise

## 2024-03-25 NOTE — PROGRESS NOTES
" Patient ID: Avelino Caro is a 68 y.o. male.     Chief Complaint: Testicle Pain    68 year old male presents to clinic with complaints of left testicular pain, associated with swelling, beginning a couple of weeks ago. Patient denies any known injury to the area. Denies lifting heavy objects, pain with urination, recent UTI, penile discharge, new sexual partners, fever, N/V/D. Denies use of OTC therapies.         Review of Systems  Review of Systems   Constitutional:  Negative for fever.   HENT:  Negative for ear pain and sinus pain.    Eyes:  Negative for discharge.   Respiratory:  Negative for cough and wheezing.    Cardiovascular:  Negative for chest pain and leg swelling.   Gastrointestinal:  Negative for diarrhea, nausea and vomiting.   Genitourinary:  Negative for dysuria, frequency, hematuria and urgency.   Musculoskeletal:  Negative for myalgias.   Skin:  Negative for rash.   Neurological:  Negative for weakness and headaches.   Psychiatric/Behavioral:  Negative for depression.        Currently Medications  Current Outpatient Medications on File Prior to Visit   Medication Sig Dispense Refill    clotrimazole (LOTRIMIN) 1 % cream Apply topically 2 (two) times daily. (Patient not taking: Reported on 3/22/2024) 60 g 3    tamsulosin (FLOMAX) 0.4 mg Cap Take 1 capsule (0.4 mg total) by mouth once daily. 30 capsule 11    triamcinolone acetonide 0.1% (KENALOG) 0.1 % cream Apply topically 2 (two) times daily. (Patient not taking: Reported on 3/22/2024) 80 g 3     No current facility-administered medications on file prior to visit.       Physical  Exam  Vitals:    03/22/24 1007   BP: 136/78   BP Location: Left arm   Patient Position: Sitting   Pulse: 87   Temp: 98.2 °F (36.8 °C)   SpO2: (!) 94%   Weight: 95.1 kg (209 lb 10.5 oz)   Height: 5' 7" (1.702 m)      Body mass index is 32.84 kg/m².  Wt Readings from Last 3 Encounters:   03/22/24 95.1 kg (209 lb 10.5 oz)   04/27/21 92 kg (202 lb 11.4 oz)   03/19/21 93.2 " kg (205 lb 7.5 oz)       Physical Exam  Vitals and nursing note reviewed. Exam conducted with a chaperone present.   Constitutional:       General: He is not in acute distress.     Appearance: He is obese. He is not ill-appearing.   HENT:      Head: Normocephalic and atraumatic.      Right Ear: External ear normal.      Left Ear: External ear normal.      Nose: Nose normal.      Mouth/Throat:      Mouth: Mucous membranes are moist.   Eyes:      Extraocular Movements: Extraocular movements intact.      Conjunctiva/sclera: Conjunctivae normal.   Cardiovascular:      Rate and Rhythm: Normal rate and regular rhythm.      Pulses: Normal pulses.      Heart sounds: No murmur heard.  Pulmonary:      Effort: Pulmonary effort is normal. No respiratory distress.      Breath sounds: No wheezing.   Abdominal:      General: There is no distension.      Palpations: Abdomen is soft. There is no mass.      Tenderness: There is no abdominal tenderness.      Hernia: There is no hernia in the left inguinal area or right inguinal area.   Genitourinary:     Pubic Area: No rash.       Penis: Normal and uncircumcised.       Testes: Cremasteric reflex is present.         Right: Mass present.         Left: Mass (larger than right side; nontender) present.      Epididymis:      Right: Normal.      Left: Normal.   Musculoskeletal:         General: No swelling.      Cervical back: Normal range of motion.   Lymphadenopathy:      Lower Body: No right inguinal adenopathy. No left inguinal adenopathy.   Skin:     Coloration: Skin is not jaundiced.      Findings: No rash.   Neurological:      General: No focal deficit present.      Mental Status: He is alert and oriented to person, place, and time.   Psychiatric:         Mood and Affect: Mood normal.         Thought Content: Thought content normal.         Labs:    Complete Blood Count  Lab Results   Component Value Date    RBC 4.85 03/25/2024    HGB 14.3 03/25/2024    HCT 43.3 03/25/2024    MCV 89  03/25/2024    MCH 29.5 03/25/2024    MCHC 33.0 03/25/2024    RDW 13.8 03/25/2024     03/25/2024    MPV 10.5 03/25/2024    GRAN 3.0 03/25/2024    GRAN 46.7 03/25/2024    LYMPH 2.4 03/25/2024    LYMPH 37.4 03/25/2024    MONO 0.9 03/25/2024    MONO 13.8 03/25/2024    EOS 0.1 03/25/2024    BASO 0.02 03/25/2024    EOSINOPHIL 1.6 03/25/2024    BASOPHIL 0.3 03/25/2024    DIFFMETHOD Automated 03/25/2024       Comprehensive Metabolic Panel  Lab Results   Component Value Date     03/25/2024    BUN 11 03/25/2024    CREATININE 0.81 03/25/2024     03/25/2024    K 4.2 03/25/2024     03/25/2024    PROT 7.5 03/25/2024    ALBUMIN 4.2 03/25/2024    BILITOT 0.7 03/25/2024    AST 27 03/25/2024    ALKPHOS 63 03/25/2024    CO2 27 03/25/2024    ALT 30 03/25/2024    ANIONGAP 9 03/25/2024       TSH  Lab Results   Component Value Date    TSH 4.710 (H) 03/25/2024       Imaging:  US Scrotum And Testicles  Narrative: EXAMINATION:  US SCROTUM AND TESTICLES    CLINICAL HISTORY:  Other specified disorders of the male genital organs    TECHNIQUE:  Sonography of the scrotum and testes.    COMPARISON:  None.    FINDINGS:  Right Testicle:    *Size: 4.2 x 2.2 x 3.0 cm  *Appearance: Unremarkable.  Small 5 mm simple cyst lower pole  *Flow: Good arterial flow  *Epididymis: Large 2.9 cm cyst with minimal complexity and low-level echoes.  Smaller 7 mm cyst  *Hydrocele: None.  *Varicocele: None.  .    Left Testicle:    *Size: 4.2 x 2.0 x 3.1 cm  *Appearance: Unremarkable.  Small simple appearing 2 mm cyst upper pole  *Flow: Good arterial flow  *Epididymis: Normal.  *Hydrocele: None.  *Varicocele: Small varicocele  .    Other findings: None.  Impression: No acute findings.    A couple of right-sided epididymal cysts are noted with the larger at 2.9 cm with minimal complexity.    Tiny bilateral sub 5 mm simple intratesticular cysts on the right and left.    Small left varicocele.    Electronically signed by: Jamie Metzger  MD  Date:    03/25/2024  Time:    10:13      Assessment/Plan:    1. Mass, scrotum  Comments:  - US scrotum  - Follow with urology  Orders:  -     US Scrotum And Testicles; Future; Expected date: 03/22/2024  -     Ambulatory referral/consult to Urology; Future; Expected date: 04/01/2024    2. Class 1 obesity due to excess calories without serious comorbidity with body mass index (BMI) of 32.0 to 32.9 in adult  Comments:  - Advised on change in diet and exercise  - Advised on increased physical activity >150min/week  Orders:  -     Comprehensive Metabolic Panel; Future; Expected date: 03/22/2024  -     Lipid Panel; Future; Expected date: 03/22/2024  -     CBC Auto Differential; Future; Expected date: 03/22/2024    3. Screening for colon cancer  -     Case Request Endoscopy: COLONOSCOPY    4. Screening PSA (prostate specific antigen)  -     PSA, Screening; Future    5. Screening for cardiovascular condition  -     Comprehensive Metabolic Panel; Future; Expected date: 03/22/2024  -     TSH; Future; Expected date: 03/22/2024  -     Lipid Panel; Future; Expected date: 03/22/2024  -     CBC Auto Differential; Future; Expected date: 03/22/2024    6. Hyperglycemia  -     TSH; Future; Expected date: 03/22/2024         Discussed how to stay healthy including: diet, exercise, refraining from smoking and discussed screening exams / tests needed for age, sex and family Hx.    RTC 2 weeks    Ashley Han PA-C

## 2024-03-26 NOTE — TELEPHONE ENCOUNTER
You had placed a referral to Urology - apt scheduled April 3  Does he still need to f/u with urology since US came back with only cysts

## 2024-03-26 NOTE — TELEPHONE ENCOUNTER
Pt notified of US results. He'd like to know if he should be concerned about future growth of the cysts.

## 2024-04-05 ENCOUNTER — OFFICE VISIT (OUTPATIENT)
Dept: FAMILY MEDICINE | Facility: CLINIC | Age: 69
End: 2024-04-05
Payer: MEDICARE

## 2024-04-05 VITALS
BODY MASS INDEX: 32.63 KG/M2 | TEMPERATURE: 98 F | SYSTOLIC BLOOD PRESSURE: 138 MMHG | HEIGHT: 67 IN | OXYGEN SATURATION: 91 % | DIASTOLIC BLOOD PRESSURE: 78 MMHG | WEIGHT: 207.88 LBS | HEART RATE: 82 BPM

## 2024-04-05 DIAGNOSIS — N50.89 MASS, SCROTUM: Primary | ICD-10-CM

## 2024-04-05 DIAGNOSIS — Z12.11 SCREENING FOR COLON CANCER: ICD-10-CM

## 2024-04-05 DIAGNOSIS — R79.89 ELEVATED TSH: ICD-10-CM

## 2024-04-05 DIAGNOSIS — R63.5 ABNORMAL WEIGHT GAIN: ICD-10-CM

## 2024-04-05 DIAGNOSIS — B35.3 TINEA PEDIS, UNSPECIFIED LATERALITY: ICD-10-CM

## 2024-04-05 PROCEDURE — 1160F RVW MEDS BY RX/DR IN RCRD: CPT | Mod: CPTII,S$GLB,, | Performed by: PHYSICIAN ASSISTANT

## 2024-04-05 PROCEDURE — 3008F BODY MASS INDEX DOCD: CPT | Mod: CPTII,S$GLB,, | Performed by: PHYSICIAN ASSISTANT

## 2024-04-05 PROCEDURE — 1101F PT FALLS ASSESS-DOCD LE1/YR: CPT | Mod: CPTII,S$GLB,, | Performed by: PHYSICIAN ASSISTANT

## 2024-04-05 PROCEDURE — 1126F AMNT PAIN NOTED NONE PRSNT: CPT | Mod: CPTII,S$GLB,, | Performed by: PHYSICIAN ASSISTANT

## 2024-04-05 PROCEDURE — 3288F FALL RISK ASSESSMENT DOCD: CPT | Mod: CPTII,S$GLB,, | Performed by: PHYSICIAN ASSISTANT

## 2024-04-05 PROCEDURE — 99214 OFFICE O/P EST MOD 30 MIN: CPT | Mod: S$GLB,,, | Performed by: PHYSICIAN ASSISTANT

## 2024-04-05 PROCEDURE — 3078F DIAST BP <80 MM HG: CPT | Mod: CPTII,S$GLB,, | Performed by: PHYSICIAN ASSISTANT

## 2024-04-05 PROCEDURE — 1159F MED LIST DOCD IN RCRD: CPT | Mod: CPTII,S$GLB,, | Performed by: PHYSICIAN ASSISTANT

## 2024-04-05 PROCEDURE — 3075F SYST BP GE 130 - 139MM HG: CPT | Mod: CPTII,S$GLB,, | Performed by: PHYSICIAN ASSISTANT

## 2024-04-05 RX ORDER — CLOTRIMAZOLE 1 %
CREAM (GRAM) TOPICAL 2 TIMES DAILY
Qty: 60 G | Refills: 3 | Status: SHIPPED | OUTPATIENT
Start: 2024-04-05

## 2024-04-09 NOTE — PROGRESS NOTES
" Patient ID: Avelino Caro is a 68 y.o. male.     Chief Complaint: Follow-up (2 week)    68 year old male presents to clinic for f/u of scrotum mass. US showed it to be cysts to bilateral testicles. Pt with follow up with urology, though he no-showed appointment. Denies worsening symptoms. Denies urinary symptoms, fever, N/V/D. Otherwise well appearing with no further concerns.           Review of Systems  Review of Systems   Constitutional:  Negative for fever.   HENT:  Negative for ear pain and sinus pain.    Eyes:  Negative for discharge.   Respiratory:  Negative for cough and wheezing.    Cardiovascular:  Negative for chest pain and leg swelling.   Gastrointestinal:  Negative for diarrhea, nausea and vomiting.   Genitourinary:  Negative for urgency.   Musculoskeletal:  Negative for myalgias.   Skin:  Negative for rash.   Neurological:  Negative for weakness and headaches.   Psychiatric/Behavioral:  Negative for depression.        Currently Medications  Current Outpatient Medications on File Prior to Visit   Medication Sig Dispense Refill    tamsulosin (FLOMAX) 0.4 mg Cap Take 1 capsule (0.4 mg total) by mouth once daily. 30 capsule 11    triamcinolone acetonide 0.1% (KENALOG) 0.1 % cream Apply topically 2 (two) times daily. 80 g 3     No current facility-administered medications on file prior to visit.       Physical  Exam  Vitals:    04/05/24 1017   BP: 138/78   BP Location: Left arm   Patient Position: Sitting   Pulse: 82   Temp: 97.8 °F (36.6 °C)   SpO2: (!) 91%   Weight: 94.3 kg (207 lb 14.3 oz)   Height: 5' 7" (1.702 m)      Body mass index is 32.56 kg/m².  Wt Readings from Last 3 Encounters:   04/05/24 94.3 kg (207 lb 14.3 oz)   03/22/24 95.1 kg (209 lb 10.5 oz)   04/27/21 92 kg (202 lb 11.4 oz)       Physical Exam  Vitals and nursing note reviewed.   Constitutional:       General: He is not in acute distress.     Appearance: He is obese. He is not ill-appearing.   HENT:      Head: Normocephalic and " atraumatic.      Right Ear: External ear normal.      Left Ear: External ear normal.      Nose: Nose normal.      Mouth/Throat:      Mouth: Mucous membranes are moist.   Eyes:      Extraocular Movements: Extraocular movements intact.      Conjunctiva/sclera: Conjunctivae normal.   Cardiovascular:      Rate and Rhythm: Normal rate and regular rhythm.      Pulses: Normal pulses.      Heart sounds: No murmur heard.  Pulmonary:      Effort: Pulmonary effort is normal. No respiratory distress.      Breath sounds: No wheezing.   Abdominal:      General: There is no distension.      Palpations: Abdomen is soft. There is no mass.      Tenderness: There is no abdominal tenderness.   Musculoskeletal:         General: No swelling.      Cervical back: Normal range of motion.   Skin:     Coloration: Skin is not jaundiced.      Findings: No rash.   Neurological:      General: No focal deficit present.      Mental Status: He is alert and oriented to person, place, and time.   Psychiatric:         Mood and Affect: Mood normal.         Thought Content: Thought content normal.         Labs:    Complete Blood Count  Lab Results   Component Value Date    RBC 4.85 03/25/2024    HGB 14.3 03/25/2024    HCT 43.3 03/25/2024    MCV 89 03/25/2024    MCH 29.5 03/25/2024    MCHC 33.0 03/25/2024    RDW 13.8 03/25/2024     03/25/2024    MPV 10.5 03/25/2024    GRAN 3.0 03/25/2024    GRAN 46.7 03/25/2024    LYMPH 2.4 03/25/2024    LYMPH 37.4 03/25/2024    MONO 0.9 03/25/2024    MONO 13.8 03/25/2024    EOS 0.1 03/25/2024    BASO 0.02 03/25/2024    EOSINOPHIL 1.6 03/25/2024    BASOPHIL 0.3 03/25/2024    DIFFMETHOD Automated 03/25/2024       Comprehensive Metabolic Panel  Lab Results   Component Value Date     03/25/2024    BUN 11 03/25/2024    CREATININE 0.81 03/25/2024     03/25/2024    K 4.2 03/25/2024     03/25/2024    PROT 7.5 03/25/2024    ALBUMIN 4.2 03/25/2024    BILITOT 0.7 03/25/2024    AST 27 03/25/2024    ALKPHOS  63 03/25/2024    CO2 27 03/25/2024    ALT 30 03/25/2024    ANIONGAP 9 03/25/2024       TSH  Lab Results   Component Value Date    TSH 4.710 (H) 03/25/2024       Imaging:  US Scrotum And Testicles  Narrative: EXAMINATION:  US SCROTUM AND TESTICLES    CLINICAL HISTORY:  Other specified disorders of the male genital organs    TECHNIQUE:  Sonography of the scrotum and testes.    COMPARISON:  None.    FINDINGS:  Right Testicle:    *Size: 4.2 x 2.2 x 3.0 cm  *Appearance: Unremarkable.  Small 5 mm simple cyst lower pole  *Flow: Good arterial flow  *Epididymis: Large 2.9 cm cyst with minimal complexity and low-level echoes.  Smaller 7 mm cyst  *Hydrocele: None.  *Varicocele: None.  .    Left Testicle:    *Size: 4.2 x 2.0 x 3.1 cm  *Appearance: Unremarkable.  Small simple appearing 2 mm cyst upper pole  *Flow: Good arterial flow  *Epididymis: Normal.  *Hydrocele: None.  *Varicocele: Small varicocele  .    Other findings: None.  Impression: No acute findings.    A couple of right-sided epididymal cysts are noted with the larger at 2.9 cm with minimal complexity.    Tiny bilateral sub 5 mm simple intratesticular cysts on the right and left.    Small left varicocele.    Electronically signed by: Jamie Metzger MD  Date:    03/25/2024  Time:    10:13      Assessment/Plan:    1. Mass, scrotum  Comments:  - US shows cyst  - f/u with urology    2. Tinea pedis, unspecified laterality  -     clotrimazole (LOTRIMIN) 1 % cream; Apply topically 2 (two) times daily.  Dispense: 60 g; Refill: 3    3. Elevated TSH  -     TSH; Future; Expected date: 07/05/2024  -     T3, FREE; Future; Expected date: 07/05/2024  -     T4, FREE; Future; Expected date: 07/05/2024    4. Abnormal weight gain  -     TSH; Future; Expected date: 07/05/2024  -     T3, FREE; Future; Expected date: 07/05/2024  -     T4, FREE; Future; Expected date: 07/05/2024    5. Screening for colon cancer  -     Ambulatory referral/consult to Endo Procedure ; Future; Expected  date: 04/06/2024         Discussed how to stay healthy including: diet, exercise, refraining from smoking and discussed screening exams / tests needed for age, sex and family Hx.    RTC 3 months with PCP    Ashley Han PA-C

## 2024-04-12 ENCOUNTER — OFFICE VISIT (OUTPATIENT)
Dept: UROLOGY | Facility: CLINIC | Age: 69
End: 2024-04-12
Payer: MEDICARE

## 2024-04-12 VITALS
DIASTOLIC BLOOD PRESSURE: 85 MMHG | SYSTOLIC BLOOD PRESSURE: 150 MMHG | BODY MASS INDEX: 32.72 KG/M2 | HEART RATE: 76 BPM | WEIGHT: 208.44 LBS | HEIGHT: 67 IN

## 2024-04-12 DIAGNOSIS — N44.2 TESTICULAR CYST: ICD-10-CM

## 2024-04-12 PROCEDURE — 3079F DIAST BP 80-89 MM HG: CPT | Mod: CPTII,S$GLB,, | Performed by: UROLOGY

## 2024-04-12 PROCEDURE — 3288F FALL RISK ASSESSMENT DOCD: CPT | Mod: CPTII,S$GLB,, | Performed by: UROLOGY

## 2024-04-12 PROCEDURE — 3077F SYST BP >= 140 MM HG: CPT | Mod: CPTII,S$GLB,, | Performed by: UROLOGY

## 2024-04-12 PROCEDURE — 99204 OFFICE O/P NEW MOD 45 MIN: CPT | Mod: S$GLB,,, | Performed by: UROLOGY

## 2024-04-12 PROCEDURE — 1101F PT FALLS ASSESS-DOCD LE1/YR: CPT | Mod: CPTII,S$GLB,, | Performed by: UROLOGY

## 2024-04-12 PROCEDURE — 3008F BODY MASS INDEX DOCD: CPT | Mod: CPTII,S$GLB,, | Performed by: UROLOGY

## 2024-04-12 PROCEDURE — 99999 PR PBB SHADOW E&M-EST. PATIENT-LVL III: CPT | Mod: PBBFAC,,, | Performed by: UROLOGY

## 2024-04-12 PROCEDURE — 1159F MED LIST DOCD IN RCRD: CPT | Mod: CPTII,S$GLB,, | Performed by: UROLOGY

## 2024-04-12 PROCEDURE — 1126F AMNT PAIN NOTED NONE PRSNT: CPT | Mod: CPTII,S$GLB,, | Performed by: UROLOGY

## 2024-04-12 NOTE — PROGRESS NOTES
Subjective:       Patient ID: Avelino Caro is a 68 y.o. male.    Chief Complaint: Cyst     This is a 68 y.o.  male patient that is new to me.  The patient was referred to me by RANDA Spivey for bilaterl testicular cyst. Patient reports that he started having left testicular pain about 2 months ago.  US on 3/25/34: showed a couple of right-sided epididymal cysts are noted with the larger at 2.9 cm with minimal complexity.   Tiny bilateral sub 5 mm simple intratesticular cysts on the right and left.   Small left varicocele.  Today patient denies pain to the testicles, swelling, discharge from penis, urinary problems, recent UTIs, fever, chills.    LAST PSA  Lab Results   Component Value Date    PSA 3.7 03/25/2024       Lab Results   Component Value Date    CREATININE 0.81 03/25/2024       ---  PMH/PSH/Medications/Allergies/Social history reviewed and as in chart.    Review of Systems   Constitutional:  Negative for activity change, chills and fever.   Respiratory:  Negative for shortness of breath.    Cardiovascular:  Negative for chest pain and palpitations.   Gastrointestinal:  Negative for abdominal pain and constipation.   Genitourinary:  Negative for difficulty urinating, dysuria, flank pain, frequency, hematuria, penile discharge, penile pain, penile swelling, scrotal swelling, testicular pain and urgency.   Neurological:  Negative for dizziness and light-headedness.     Objective:      Physical Exam  HENT:      Head: Normocephalic.   Pulmonary:      Effort: Pulmonary effort is normal.   Abdominal:      General: Abdomen is flat.      Palpations: Abdomen is soft.   Genitourinary:     Penis: Normal and uncircumcised. No tenderness, discharge, swelling or lesions.       Testes:         Right: Tenderness or swelling not present.         Left: Tenderness or swelling not present.      Epididymis:      Right: Not enlarged. Mass present. No tenderness.      Left: Not enlarged. No mass or tenderness.       Comments: Mass felt to right epididymal head, no tenderness noted on palpation to bilateral testicles.  Musculoskeletal:         General: Normal range of motion.      Cervical back: Normal range of motion.   Skin:     General: Skin is warm and dry.   Neurological:      Mental Status: He is alert and oriented to person, place, and time.       Assessment:     Problem Noted   Testicular Cyst 4/12/2024    - US scrotum  - Follow with urology         Plan:     Reviewed Scrotal US. Discussed testicular cyst and management of them. Instructed to take NSAIDs, tylenol, warm packs/ice pack if discomfort or  pain occurs. Patient verbalized understanding.  Advised to follow- up if pain occurs that will not go away or swelling of testicles. Patient verbalized understanding.  Follow-up PRN for problems or concerns    CHANG Haywood    I spent a total of 20 minutes on the day of the visit.This includes face to face time and non-face to face time preparing to see the patient (eg, review of tests), obtaining and/or reviewing separately obtained history, documenting clinical information in the electronic or other health record, independently interpreting results and communicating results to the patient/family/caregiver, or care coordinator.

## 2024-06-13 ENCOUNTER — TELEPHONE (OUTPATIENT)
Dept: ENDOSCOPY | Facility: HOSPITAL | Age: 69
End: 2024-06-13

## 2024-06-13 NOTE — TELEPHONE ENCOUNTER
Attempted to contact patient to schedule colonoscopy. The patient did not answer the call.  Left voice message  requesting a call back at [phone] to get procedure scheduled.